# Patient Record
Sex: FEMALE | Race: WHITE
[De-identification: names, ages, dates, MRNs, and addresses within clinical notes are randomized per-mention and may not be internally consistent; named-entity substitution may affect disease eponyms.]

---

## 2017-03-29 DIAGNOSIS — E03.9 HYPOTHYROIDISM: ICD-10-CM

## 2017-03-29 RX ORDER — LEVOTHYROXINE SODIUM 50 UG/1
50 TABLET ORAL DAILY
Qty: 90 TABLET | Refills: 0 | Status: SHIPPED | OUTPATIENT
Start: 2017-03-29 | End: 2017-06-14 | Stop reason: DRUGHIGH

## 2017-03-29 NOTE — TELEPHONE ENCOUNTER
[facility] Call Center    Phone Message    Name of Caller: Herminia    Phone Number: Work number on file:  [phone] (work)    Best time to return call: any    May a detailed message be left on voicemail: yes    Relation to patient: Self    Reason for Call: Medication Refill Request    Has the patient contacted the pharmacy for the refill? Yes   Name of medication being requested: levothyroxine (SYNTHROID, LEVOTHROID) 50 MCG tablet [53066]  Provider who prescribed the medication: Dr. Goodman  Pharmacy: [facility]  Date medication is needed: asap         Action Taken: Message routed to:  Adult Clinics: Endocrinology p [id number]

## 2017-05-26 ENCOUNTER — HEALTH MAINTENANCE LETTER (OUTPATIENT)
Age: 52
End: 2017-05-26

## 2017-06-12 ENCOUNTER — DOCUMENTATION ONLY (OUTPATIENT)
Dept: LAB | Facility: CLINIC | Age: 52
End: 2017-06-12

## 2017-06-12 DIAGNOSIS — E03.9 HYPOTHYROIDISM: Primary | ICD-10-CM

## 2017-06-12 NOTE — PROGRESS NOTES
Orders placed.    Hien Cruz LPN  Adult Endocrinology  [facility]

## 2017-06-14 ENCOUNTER — MYC MEDICAL ADVICE (OUTPATIENT)
Dept: ENDOCRINOLOGY | Facility: CLINIC | Age: 52
End: 2017-06-14

## 2017-06-14 ENCOUNTER — TELEPHONE (OUTPATIENT)
Dept: ENDOCRINOLOGY | Facility: CLINIC | Age: 52
End: 2017-06-14

## 2017-06-14 DIAGNOSIS — E03.9 HYPOTHYROIDISM: ICD-10-CM

## 2017-06-14 DIAGNOSIS — E06.3 CHRONIC LYMPHOCYTIC THYROIDITIS: Primary | ICD-10-CM

## 2017-06-14 LAB
T4 FREE SERPL-MCNC: 1.08 NG/DL (ref 0.76–1.46)
TSH SERPL DL<=0.05 MIU/L-ACNC: 12.29 MU/L (ref 0.4–4)

## 2017-06-14 PROCEDURE — 36415 COLL VENOUS BLD VENIPUNCTURE: CPT | Performed by: INTERNAL MEDICINE

## 2017-06-14 PROCEDURE — 84443 ASSAY THYROID STIM HORMONE: CPT | Performed by: INTERNAL MEDICINE

## 2017-06-14 PROCEDURE — 84439 ASSAY OF FREE THYROXINE: CPT | Performed by: INTERNAL MEDICINE

## 2017-06-14 RX ORDER — LEVOTHYROXINE SODIUM 100 UG/1
100 TABLET ORAL DAILY
Qty: 90 TABLET | Refills: 2 | Status: SHIPPED | OUTPATIENT
Start: 2017-06-14 | End: 2017-09-06 | Stop reason: DRUGHIGH

## 2017-09-05 DIAGNOSIS — E06.3 CHRONIC LYMPHOCYTIC THYROIDITIS: ICD-10-CM

## 2017-09-05 LAB
T4 FREE SERPL-MCNC: 1.29 NG/DL (ref 0.76–1.46)
TSH SERPL DL<=0.005 MIU/L-ACNC: 6.14 MU/L (ref 0.4–4)

## 2017-09-05 PROCEDURE — 84443 ASSAY THYROID STIM HORMONE: CPT | Performed by: INTERNAL MEDICINE

## 2017-09-05 PROCEDURE — 84439 ASSAY OF FREE THYROXINE: CPT | Performed by: INTERNAL MEDICINE

## 2017-09-05 PROCEDURE — 36415 COLL VENOUS BLD VENIPUNCTURE: CPT | Performed by: INTERNAL MEDICINE

## 2017-09-06 ENCOUNTER — MYC MEDICAL ADVICE (OUTPATIENT)
Dept: ENDOCRINOLOGY | Facility: CLINIC | Age: 52
End: 2017-09-06

## 2017-09-06 DIAGNOSIS — E06.3 CHRONIC LYMPHOCYTIC THYROIDITIS: Primary | ICD-10-CM

## 2017-09-06 RX ORDER — LEVOTHYROXINE SODIUM 125 UG/1
125 TABLET ORAL DAILY
Qty: 90 TABLET | Refills: 3 | Status: SHIPPED | OUTPATIENT
Start: 2017-09-06 | End: 2017-11-06

## 2017-09-06 NOTE — TELEPHONE ENCOUNTER
Left message for patient to return call.    Hien Cruz LPN  Adult Endocrinology  [facility]      Per Rex Result Note Message:  Please let Herminia know that her thyroid level is improving but not yet at goal. I will increase levothyroxine to 125 mcg daily and repeat lab in 2 months. Order placed.     Thanks,   Blanco

## 2017-09-29 ENCOUNTER — TELEPHONE (OUTPATIENT)
Dept: ENDOCRINOLOGY | Facility: CLINIC | Age: 52
End: 2017-09-29

## 2017-09-29 NOTE — TELEPHONE ENCOUNTER
Writer verified with pharmacist patient did not  Levothyroxine 125 mcg.     Left message on patients home and cell to return call or check My Chart message.    Hien Cruz LPN  Adult Endocrinology  [facility]

## 2017-09-29 NOTE — TELEPHONE ENCOUNTER
Per Rex Message:  Please let Herminia know that her thyroid level is improving but not yet at goal. I will increase levothyroxine to 125 mcg daily and repeat lab in 2 months. Order placed.     Patient is agreeable with plan. Lab scheduled.     Jocy Chris LPN

## 2017-10-16 ENCOUNTER — TELEPHONE (OUTPATIENT)
Dept: ENDOCRINOLOGY | Facility: CLINIC | Age: 52
End: 2017-10-16

## 2017-10-16 DIAGNOSIS — E06.3 CHRONIC LYMPHOCYTIC THYROIDITIS: ICD-10-CM

## 2017-10-16 NOTE — LETTER
November 1, 2017      Herminia Husain  [address] [address]              Dear Herminia,    We have been trying to reach you regarding your Levothyroxine dose. Please contact the clinic at [phone].    Thank you!      Sincerely,      Blanco Goodman MD  [facility]  Department of Endocrinology  ct

## 2017-10-16 NOTE — TELEPHONE ENCOUNTER
[facility] Call Center    Phone Message    Name of Caller: Herminia    Phone Number: Home number on file [phone] (home) or Cell number on file:    Telephone Information:   Mobile none       Best time to return call: ANyt    May a detailed message be left on voicemail: yes    Relation to patient: Self    Reason for Call: Medication Question or concern regarding medication   Prescription Clarification:   Name of Medication: levothyroxine (SYNTHROID/LEVOTHROID) 125 MCG tablet [[id number]] (Order [id number])     Prescribing Provider: Dr. Simeon   Pharmacy: [facility]   What on the order needs clarification? Patient would like to clarify MCG amount as patient picked up Rx last week and the pharmacy gave her 100 mcg (old script) instead of 125 mcg. Patient would like to discuss with nurse what she is recommended to take. Please advise.      Is patient symptomatic? No. Describe question or concern: N/A         Action Taken: Message routed to:  Adult Clinics: Endocrinology p [id number]

## 2017-10-16 NOTE — TELEPHONE ENCOUNTER
"Per 9/29/17 telephone encounter:  \"Please let Herminia know that her thyroid level is improving but not yet at goal. I will increase levothyroxine to 125 mcg daily and repeat lab in 2 months. Order placed.\"          Attempted to contact patient to review and determine if she used old bottle to call in refill to pharmacy. Left voicemail for patient to contact our office.       Jayla Cleary RN  Endocrine Care Coordinator  [facility]    "

## 2017-10-20 NOTE — TELEPHONE ENCOUNTER
Writer reviewed with pharmacy. Levothyroxine 100 mcg was last filled on 10/2. The prescription for 125 mcg was placed on hold. Staff assuming patient is on Levo 100 mcg because new dose was never picked up.     Left message for patient to return call.    Hien Cruz LPN  Adult Endocrinology  [facility]

## 2017-11-01 NOTE — TELEPHONE ENCOUNTER
Third attempt made to contact patient.     Letter mailed to patient requesting for patient to contact clinic.    Hien Cruz LPN  Adult Endocrinology  [facility]

## 2017-11-03 NOTE — TELEPHONE ENCOUNTER
returned clinic call. He verified patient did not increase dose to 125 mcg as directed 9/29/17. Currently on Levothyroxine 100 mcg daily.     Will forward to Dr. Goodman for new recommendation.    Hien Cruz LPN  Adult Endocrinology  [facility]

## 2017-11-06 RX ORDER — LEVOTHYROXINE SODIUM 125 UG/1
125 TABLET ORAL DAILY
Qty: 90 TABLET | Refills: 3 | Status: SHIPPED | OUTPATIENT
Start: 2017-11-06 | End: 2018-12-30

## 2017-11-06 NOTE — TELEPHONE ENCOUNTER
Patient notified of recommendation and is agreeable with plan. Prescription for Levo 125 mcg resent to pharmacy.     Per Rex Message:  Let do 125 mcg of levothyroxine and repeat lab in 2 months.     Thanks,   Blanco Cruz LPN  Adult Endocrinology  [facility]

## 2018-12-30 DIAGNOSIS — E06.3 CHRONIC LYMPHOCYTIC THYROIDITIS: ICD-10-CM

## 2018-12-31 NOTE — TELEPHONE ENCOUNTER
Per Dr. Goodman:    She should be seen by PCP or me. She can get lab for now but need follow up for further adjustment with PCP or me.     Blanco       Left voicemail for patient to contact our office.     Jayla Cleary RN  Endocrine Care Coordinator  [facility]

## 2018-12-31 NOTE — TELEPHONE ENCOUNTER
Requested Medications      Name from pharmacy: LEVOTHYROXINE 125 MCG TABLET         Will file in chart as: levothyroxine (SYNTHROID/LEVOTHROID) 125 MCG tablet    Sig: Take 1 tablet (125 mcg) by mouth daily    Disp:  90 tablet    Refills:  3    Start: 12/30/2018    Class: E-Prescribe    For: Chronic lymphocytic thyroiditis    Requested on: 11/6/2017    Last ordered: 1 year ago by Blanco Goodman MD Last refill: 9/22/2018    Rx #: [id number]    Thyroid Protocol Jivkxk88/31 6:55 AM  x Recent (12 mo) or future (30 days) visit within the authorizing provider's specialty   x Normal TSH on file in past 12 months    Patient is 12 years or older    No active pregnancy on record    No positive pregnancy test in past 12 months           Per 10/16/17 telephone encounter, patient was to have labs in 2 months after being on levothyroxine 125 mcg daily.    Last office visit with Dr. Goodman on 7/18/16. Patient due for labs and office visit.      Jayla Cleary RN  Endocrine Care Coordinator  [facility]

## 2019-01-11 NOTE — TELEPHONE ENCOUNTER
Again attempted to contact patient. Left voicemail for patient to contact our office.     Jayla Cleary RN  Endocrine Care Coordinator  [facility]

## 2019-02-20 RX ORDER — LEVOTHYROXINE SODIUM 125 UG/1
125 TABLET ORAL DAILY
Qty: 90 TABLET | Refills: 3 | Status: SHIPPED | OUTPATIENT
Start: 2019-02-20 | End: 2019-07-26

## 2019-06-10 ENCOUNTER — TELEPHONE (OUTPATIENT)
Dept: ENDOCRINOLOGY | Facility: CLINIC | Age: 54
End: 2019-06-10

## 2019-06-10 DIAGNOSIS — E06.3 CHRONIC LYMPHOCYTIC THYROIDITIS: ICD-10-CM

## 2019-06-10 NOTE — TELEPHONE ENCOUNTER
Please refer to 10/16/17 Encounter. Patient past due for lab. Patient last seen 7/18/16.    Will send to Clinic Staff to contact patient to determine if being seen by a different provider.    Hien Cruz LPN  Diabetes Clinic Coordinator   Adult Endocrinology and Pediatric Specialty Clinics  [facility]

## 2019-06-10 NOTE — LETTER
June 24, 2019      Herminia Husain  [address] [address]            Dear Herminia Husain:    This is to remind you that your provider wanted you to return to the clinic for lab test.    If you are coming in for Lipids and/or Glucose testing please fast for 10-12 hours. Morning medications can be taken with water.  Your last visit appears to be 7/18/16.  Please let us know if you have transferred care.      You may call our office to schedule an appointment.    Please disregard this notice if you have already had your labs drawn or made an            appointment.    Sincerely,        Blanco Goodman MD

## 2019-07-05 RX ORDER — LEVOTHYROXINE SODIUM 125 UG/1
125 TABLET ORAL DAILY
Qty: 90 TABLET | Refills: 3 | OUTPATIENT
Start: 2019-07-05

## 2019-07-11 NOTE — TELEPHONE ENCOUNTER
[phone] - Ok to leave voicemail.     Needs to have orders for lab draw that is due entered. Patient likes to go to [facility] to have done and wants confirmed that they are ready for her before she goes.    Please call patient to confirm. Has the day off from work tomorrow 7/11/19. OK to leave detailed vmx.

## 2019-07-12 DIAGNOSIS — E06.3 CHRONIC LYMPHOCYTIC THYROIDITIS: ICD-10-CM

## 2019-07-12 LAB
T4 FREE SERPL-MCNC: 0.88 NG/DL (ref 0.76–1.46)
TSH SERPL DL<=0.005 MIU/L-ACNC: 11.54 MU/L (ref 0.4–4)

## 2019-07-12 PROCEDURE — 36415 COLL VENOUS BLD VENIPUNCTURE: CPT | Performed by: INTERNAL MEDICINE

## 2019-07-12 PROCEDURE — 84443 ASSAY THYROID STIM HORMONE: CPT | Performed by: INTERNAL MEDICINE

## 2019-07-12 PROCEDURE — 84439 ASSAY OF FREE THYROXINE: CPT | Performed by: INTERNAL MEDICINE

## 2019-07-12 NOTE — TELEPHONE ENCOUNTER
Called patient and left detailed message that she is due to be seen for follow up and to have labs drawn. Instructed the patient to call back and make an appointment.     Vianey Ha [facility]  Adult Endocrinology  [facility]

## 2019-07-13 ENCOUNTER — MYC MEDICAL ADVICE (OUTPATIENT)
Dept: MULTI SPECIALTY CLINIC | Facility: CLINIC | Age: 54
End: 2019-07-13

## 2019-07-13 DIAGNOSIS — E06.3 CHRONIC LYMPHOCYTIC THYROIDITIS: ICD-10-CM

## 2019-07-18 NOTE — TELEPHONE ENCOUNTER
Per Dr. Goodman Message:  She should be seen in the clinic because the last visit was 2016. Otherwise she should make appointment to see primary care to address this lab.     Thanks,   Blanco     Patient notified of message. She is requesting refill of levothyroxine 125mcg until she can be seen in clinic. Provider does not have any openings until 10/7/19.    Will forward to Dr. Philip per request of patient.    Hien Cruz LPN  Diabetes Clinic Coordinator   Adult Endocrinology and Pediatric Specialty Clinics  [facility]

## 2019-07-18 NOTE — TELEPHONE ENCOUNTER
Per Dr. Goodman Message:  Can you please let pt know that her lab showed that the dose of thyroid replacement is not adequate. Is she working with primary doctor for adjusting levothyroxine?     Contacted patient to review. She reports she has not taken any levothyroxine since mid-May. Reports she ran out of medication and didn't refill it. States she is feeling OK. She does not have primary care provider. She would like Dr. Blanco Goodman to address current lab and then will transfer to primary.    Will forward to Dr. Blanco Goodman.    Hien Cruz LPN  Diabetes Clinic Coordinator   Adult Endocrinology and Pediatric Specialty Clinics  [facility]

## 2019-07-25 NOTE — TELEPHONE ENCOUNTER
Left message for patient to return call. Please determine what pharmacy patient is currently using.    Writer confirmed with [facility] patient has not filled there in over a year.    Hien Cruz LPN  Diabetes Clinic Coordinator  Adult Endocrinology and Pediatric Specialty Clinics  [facility]

## 2019-07-26 RX ORDER — LEVOTHYROXINE SODIUM 125 UG/1
125 TABLET ORAL DAILY
Qty: 90 TABLET | Refills: 0 | Status: SHIPPED | OUTPATIENT
Start: 2019-07-26

## 2019-07-26 NOTE — TELEPHONE ENCOUNTER
Patient confirmed she does use [facility].    She would like refill until 10/17 Appt.    Will forward to Dr. Blanco Goodman to review.    Hien Cruz LPN  Diabetes Clinic Coordinator   Adult Endocrinology and Pediatric Specialty Clinics  [facility]

## 2019-09-28 ENCOUNTER — HEALTH MAINTENANCE LETTER (OUTPATIENT)
Age: 54
End: 2019-09-28

## 2019-10-22 DIAGNOSIS — E06.3 CHRONIC LYMPHOCYTIC THYROIDITIS: ICD-10-CM

## 2019-10-22 RX ORDER — LEVOTHYROXINE SODIUM 125 UG/1
TABLET ORAL
Qty: 90 TABLET | Refills: 0 | OUTPATIENT
Start: 2019-10-22

## 2019-10-22 NOTE — TELEPHONE ENCOUNTER
Requested Prescriptions      Name from pharmacy: LEVOTHYROXINE 125 MCG TABLET         Will file in chart as: levothyroxine (SYNTHROID/LEVOTHROID) 125 MCG tablet         Sig: TAKE 1 TABLET BY MOUTH EVERY DAY    Disp:  90 tablet    Refills:  0    Start: 10/22/2019    Class: E-Prescribe    For: Chronic lymphocytic thyroiditis    Last ordered: 2 months ago by Blanco Goodman MD Last refill: 7/26/2019    Rx #: [id number]    Thyroid Protocol Imyadu70/22 6:54 AM  x Recent (12 mo) or future (30 days) visit within the authorizing provider's specialty   x Normal TSH on file in past 12 months    Patient is 12 years or older    Medication is active on med list    No active pregnancy on record    No positive pregnancy test in past 12 months           Patient has not been seen since 7/18/16. Per 7/13/19 [employer] message patient requested a refill until 10/7/19 appointment with primary care provider. Refill denied with request to send to PCP.      Jayla Cleary RN  Endocrine Care Coordinator  [facility]

## 2021-01-10 ENCOUNTER — HEALTH MAINTENANCE LETTER (OUTPATIENT)
Age: 56
End: 2021-01-10

## 2021-03-13 ENCOUNTER — HEALTH MAINTENANCE LETTER (OUTPATIENT)
Age: 56
End: 2021-03-13

## 2021-10-23 ENCOUNTER — HEALTH MAINTENANCE LETTER (OUTPATIENT)
Age: 56
End: 2021-10-23

## 2022-01-04 NOTE — TELEPHONE ENCOUNTER
Left message for patient to return call.    Hien Cruz LPN  Adult Endocrinology  [facility]      Per Rex Result Note Message:  Could you please let Herminia know that her TFT is not at goal? I will increase levothyroxine to 100 mcg daily and repeat lab in 2 months. New prescription will be sent to pharmacy.     Thanks,   Blanco   
Patient notified of results and recommendations. She confirmed she has been on Levothyroxine 100 mcg since July. Lab appt scheduled for September.     Hien Cruz LPN  Adult Endocrinology  [facility]        
Second message left for patient to contact clinic.    Writer confirmed with pharmacy. Patient did  Levothyroxine in June and 7/8/17.    Hien Cruz LPN  Adult Endocrinology  [facility]        
Statement Selected

## 2022-02-12 ENCOUNTER — HEALTH MAINTENANCE LETTER (OUTPATIENT)
Age: 57
End: 2022-02-12

## 2022-10-09 ENCOUNTER — HEALTH MAINTENANCE LETTER (OUTPATIENT)
Age: 57
End: 2022-10-09

## 2023-02-18 ENCOUNTER — HEALTH MAINTENANCE LETTER (OUTPATIENT)
Age: 58
End: 2023-02-18

## 2023-03-25 ENCOUNTER — HEALTH MAINTENANCE LETTER (OUTPATIENT)
Age: 58
End: 2023-03-25

## 2024-03-16 ENCOUNTER — HEALTH MAINTENANCE LETTER (OUTPATIENT)
Age: 59
End: 2024-03-16

## 2024-10-15 ENCOUNTER — LAB REQUISITION (OUTPATIENT)
Dept: LAB | Facility: CLINIC | Age: 59
End: 2024-10-15
Payer: COMMERCIAL

## 2024-10-15 DIAGNOSIS — Z12.4 ENCOUNTER FOR SCREENING FOR MALIGNANT NEOPLASM OF CERVIX: ICD-10-CM

## 2024-10-15 LAB
HPV HR 12 DNA CVX QL NAA+PROBE: NEGATIVE
HPV16 DNA CVX QL NAA+PROBE: NEGATIVE
HPV18 DNA CVX QL NAA+PROBE: NEGATIVE
HUMAN PAPILLOMA VIRUS FINAL DIAGNOSIS: NORMAL

## 2024-10-15 PROCEDURE — G0145 SCR C/V CYTO,THINLAYER,RESCR: HCPCS | Mod: ORL | Performed by: OBSTETRICS & GYNECOLOGY

## 2024-10-15 PROCEDURE — 87624 HPV HI-RISK TYP POOLED RSLT: CPT | Mod: ORL | Performed by: OBSTETRICS & GYNECOLOGY

## 2024-10-15 PROCEDURE — 87624 HPV HI-RISK TYP POOLED RSLT: CPT | Performed by: OBSTETRICS & GYNECOLOGY

## 2024-10-18 LAB
BKR AP ASSOCIATED HPV REPORT: NORMAL
BKR LAB AP GYN ADEQUACY: NORMAL
BKR LAB AP GYN INTERPRETATION: NORMAL
BKR LAB AP LMP: NORMAL
BKR LAB AP PREVIOUS ABNL DX: NORMAL
BKR LAB AP PREVIOUS ABNORMAL: NORMAL
PATH REPORT.COMMENTS IMP SPEC: NORMAL
PATH REPORT.COMMENTS IMP SPEC: NORMAL
PATH REPORT.RELEVANT HX SPEC: NORMAL